# Patient Record
Sex: MALE | Race: WHITE
[De-identification: names, ages, dates, MRNs, and addresses within clinical notes are randomized per-mention and may not be internally consistent; named-entity substitution may affect disease eponyms.]

---

## 2019-06-20 ENCOUNTER — HOSPITAL ENCOUNTER (EMERGENCY)
Dept: HOSPITAL 56 - MW.ED | Age: 74
Discharge: HOME | End: 2019-06-20
Payer: MEDICARE

## 2019-06-20 DIAGNOSIS — Z79.84: ICD-10-CM

## 2019-06-20 DIAGNOSIS — R10.9: Primary | ICD-10-CM

## 2019-06-20 DIAGNOSIS — E11.9: ICD-10-CM

## 2019-06-20 DIAGNOSIS — Z86.73: ICD-10-CM

## 2019-06-20 LAB
CHLORIDE SERPL-SCNC: 107 MMOL/L (ref 98–107)
SODIUM SERPL-SCNC: 140 MMOL/L (ref 136–148)

## 2019-06-20 PROCEDURE — 87040 BLOOD CULTURE FOR BACTERIA: CPT

## 2019-06-20 PROCEDURE — 74177 CT ABD & PELVIS W/CONTRAST: CPT

## 2019-06-20 PROCEDURE — 81003 URINALYSIS AUTO W/O SCOPE: CPT

## 2019-06-20 PROCEDURE — 85610 PROTHROMBIN TIME: CPT

## 2019-06-20 PROCEDURE — 82150 ASSAY OF AMYLASE: CPT

## 2019-06-20 PROCEDURE — 84484 ASSAY OF TROPONIN QUANT: CPT

## 2019-06-20 PROCEDURE — 85025 COMPLETE CBC W/AUTO DIFF WBC: CPT

## 2019-06-20 PROCEDURE — 80053 COMPREHEN METABOLIC PANEL: CPT

## 2019-06-20 PROCEDURE — 83605 ASSAY OF LACTIC ACID: CPT

## 2019-06-20 PROCEDURE — 36415 COLL VENOUS BLD VENIPUNCTURE: CPT

## 2019-06-20 PROCEDURE — 93005 ELECTROCARDIOGRAM TRACING: CPT

## 2019-06-20 PROCEDURE — 83690 ASSAY OF LIPASE: CPT

## 2019-06-20 PROCEDURE — 96360 HYDRATION IV INFUSION INIT: CPT

## 2019-06-20 PROCEDURE — 71045 X-RAY EXAM CHEST 1 VIEW: CPT

## 2019-06-20 PROCEDURE — 99284 EMERGENCY DEPT VISIT MOD MDM: CPT

## 2019-06-20 NOTE — CT
INDICATION:



Right-sided abdominal pain 



TECHNIQUE:



CT abdomen and pelvis acquired with 80 cc Isovue 370 IV contrast.



COMPARISON:



None 



FINDINGS:



Lower chest: Coronary artery calcifications. Small hiatal hernia. 



Liver: Unremarkable.  



Spleen: Unremarkable.  



Pancreas: Unremarkable.  



Gallbladder and bile ducts: Cholelithiasis. 



Adrenal glands: Unremarkable.  



Kidneys: Left renal atrophy. Nonobstructive left nephrolithiasis. Hypodense 

1.1 cm lesion on the right kidney measuring 30 Hounsfield units in density. 

GI tract: Unremarkable.  Appendix is normal.  



Vascular structures: Moderate atherosclerotic disease. 



Lymph nodes: Unremarkable.  



Miscellaneous: Small fat containing umbilical hernia. No free air or 

significant free fluid.  



Pelvic Organs: Unremarkable.  



Bones: Unremarkable for age.  



IMPRESSION:



No etiology seen to explain right-sided abdominal pain. Normal appendix. 



Coronary artery disease.



Small hiatal hernia.



Cholelithiasis.



Left renal atrophy with nonobstructive left nephrolithiasis.



Indeterminate hypodense lesion on the right kidney. Recommend nonemergent 

renal ultrasound for further characterization.



Small fat containing umbilical hernia.



Please note that all CT scans at this facility use dose modulation, 

iterative reconstruction, and/or weight-based dosing when appropriate to 

reduce radiation dose to as low as reasonably achievable.



Dictated by Nancy Briones MD @ Jun 20 2019 11:08PM



Signed by Dr. Nancy Briones @ Jun 20 2019 11:22PM

## 2019-06-20 NOTE — CR
Indication:



Abdominal pain



Technique:



Chest 1 view



Comparison:



None



Findings/Impression:



Cardiovascular and mediastinum: Heart size and vasculature are normal in 

caliber and appearance.  Mediastinum is within normal limits.  



Lungs and pleural space: Lungs are clear.  No sign of infiltrate or mass.  

No sign of pleural effusion.  No pneumothorax.  



Bones and soft tissues: No significant findings.



Dictated by Nancy Briones MD @ Jun 20 2019 10:56PM



Signed by Dr. Nancy Briones @ Jun 20 2019 10:58PM

## 2019-06-20 NOTE — EDM.PDOC
ED HPI GENERAL MEDICAL PROBLEM





- General


Chief Complaint: Abdominal Pain


Stated Complaint: ABM


Time Seen by Provider: 06/20/19 21:39





- History of Present Illness


INITIAL COMMENTS - FREE TEXT/NARRATIVE: 


HISTORY AND PHYSICAL:





History of present illness:


Patient is a 73-year-old white male who presents with concern of abdominal pain 

this is not localized it's intermittent and on arrival here has resolved he 

denies fever chills urinary symptoms nausea vomiting diarrhea or other 

complaints





Review of systems: 


As per history of present illness and below otherwise all systems reviewed and 

negative.





Past medical history: 


As per history of present illness and as reviewed below otherwise 

noncontributory.





Surgical history: 


As per history of present illness and as reviewed below otherwise 

noncontributory.





Social history: 


No reported history of drug or alcohol abuse.





Family history: 


As per history of present illness and as reviewed below otherwise 

noncontributory.





Physical exam:


HEENT: Atraumatic, normocephalic, pupils reactive, negative for conjunctival 

pallor or scleral icterus, mucous membranes moist, throat clear, neck supple, 

nontender, trachea midline.


Lungs: Clear to auscultation, breath sounds equal bilaterally, chest nontender.


Heart: S1S2, regular, negative for clicks, rubs, or JVD.


Abdomen: Soft, nondistended, nontender. Negative for masses or 

hepatosplenomegaly. Negative for costovertebral tenderness.


Pelvis: Stable nontender.


Genitourinary: Deferred.


Rectal: Deferred.


Extremities: Atraumatic, negative for cords or calf pain. Neurovascular 

unremarkable.


Neuro: Awake, alert, oriented. Cranial nerves II through XII unremarkable. 

Cerebellum unremarkable. Motor and sensory unremarkable throughout. Exam 

nonfocal.





Diagnostics:


CBC CMP amylase lipase UA CT abdomen and pelvis with IV contrast





Therapeutics:


 saline 1 L bolus





Impression: 


#1 intermittent undifferentiated abdominal pain resolved





Definitive disposition and diagnosis as appropriate pending reevaluation and 

review of above.





  ** lower abdomen


Pain Score (Numeric/FACES): 6





- Related Data


 Allergies











Allergy/AdvReac Type Severity Reaction Status Date / Time


 


No Known Allergies Allergy   Verified 06/20/19 21:39











Home Meds: 


 Home Meds





metFORMIN [Glucophage XR] 5 tab PO BEDTIME 06/20/19 [History]











Past Medical History


HEENT History: Reports: Cataract


Cardiovascular History: Reports: None


Respiratory History: Reports: None


Gastrointestinal History: Reports: Other (See Below)


Other Gastrointestinal History: abdominal herina


Genitourinary History: Reports: Renal Calculus


Musculoskeletal History: Reports: None


Neurological History: Reports: CVA


Psychiatric History: Reports: None


Endocrine/Metabolic History: Reports: Diabetes, Type II


Hematologic History: Reports: None


Immunologic History: Reports: None


Oncologic (Cancer) History: Reports: None


Dermatologic History: Reports: None





- Past Surgical History


Head Surgeries/Procedures: Reports: None


HEENT Surgical History: Reports: Cataract Surgery


Cardiovascular Surgical History: Reports: None


Respiratory Surgical History: Reports: None


GI Surgical History: Reports: None


Male  Surgical History: Reports: None


Endocrine Surgical History: Reports: None


Neurological Surgical History: Reports: None


Musculoskeletal Surgical History: Reports: None


Oncologic Surgical History: Reports: None


Dermatological Surgical History: Reports: None





Social & Family History





- Family History


Family Medical History: Noncontributory





- Tobacco Use


Smoking Status *Q: Never Smoker


Second Hand Smoke Exposure: No





- Caffeine Use


Caffeine Use: Reports: Coffee





- Recreational Drug Use


Recreational Drug Use: No





ED ROS GENERAL





- Review of Systems


Review Of Systems: ROS reveals no pertinent complaints other than HPI.





ED EXAM, GENERAL





- Physical Exam


Exam: See Below (See dictation)





Course





- Vital Signs


Text/Narrative:: 


I discussed with patient admission for observation in light of his diagnostics 

be in nonspecific at this point and resolution he prefers outpatient follow-up





Last Recorded V/S: 





 Last Vital Signs











Temp  36.3 C   06/20/19 21:23


 


Pulse  54 L  06/20/19 21:23


 


Resp  18   06/20/19 21:23


 


BP  142/81 H  06/20/19 21:23


 


Pulse Ox  95   06/20/19 21:23














- Orders/Labs/Meds


Orders: 





 Active Orders 24 hr











 Category Date Time Status


 


 EKG Documentation Completion [RC] STAT Care  06/20/19 21:37 Active


 


 CULTURE BLOOD [BC] Stat Lab  06/20/19 21:46 Received


 


 CULTURE BLOOD [BC] Stat Lab  06/20/19 21:57 Received


 


 Sodium Chloride 0.9% [Saline Flush] Med  06/20/19 21:38 Active





 10 ml FLUSH ASDIRECTED PRN   


 


 Sodium Chloride 0.9% [Saline Flush] Med  06/20/19 21:38 Active





 2.5 ml FLUSH ASDIRECTED PRN   


 


 Blood Culture x2 Reflex Set [OM.PC] Stat Oth  06/20/19 21:38 Ordered


 


 Saline Lock Insert [OM.PC] Stat Ot  06/20/19 21:37 Ordered








 Medication Orders





Sodium Chloride (Saline Flush)  10 ml FLUSH ASDIRECTED PRN


   PRN Reason: Keep Vein Open


Sodium Chloride (Saline Flush)  2.5 ml FLUSH ASDIRECTED PRN


   PRN Reason: Keep Vein Open








Labs: 





 Laboratory Tests











  06/20/19 06/20/19 06/20/19 Range/Units





  21:31 21:31 21:31 


 


WBC  10.90    (4.0-11.0)  K/uL


 


RBC  4.60    (4.50-5.90)  M/uL


 


Hgb  14.9    (13.0-17.0)  g/dL


 


Hct  44.1    (38.0-50.0)  %


 


MCV  95.9    (80.0-98.0)  fL


 


MCH  32.4 H    (27.0-32.0)  pg


 


MCHC  33.8    (31.0-37.0)  g/dL


 


RDW Std Deviation  49.4    (28.0-62.0)  fl


 


RDW Coeff of Renae  14    (11.0-15.0)  %


 


Plt Count  221    (150-400)  K/uL


 


MPV  10.80    (7.40-12.00)  fL


 


Neut % (Auto)  58.3    (48.0-80.0)  %


 


Lymph % (Auto)  30.5    (16.0-40.0)  %


 


Mono % (Auto)  8.3    (0.0-15.0)  %


 


Eos % (Auto)  2.4    (0.0-7.0)  %


 


Baso % (Auto)  0.5    (0.0-1.5)  %


 


Neut # (Auto)  6.4 H    (1.4-5.7)  K/uL


 


Lymph # (Auto)  3.3 H    (0.6-2.4)  K/uL


 


Mono # (Auto)  0.9 H    (0.0-0.8)  K/uL


 


Eos # (Auto)  0.3    (0.0-0.7)  K/uL


 


Baso # (Auto)  0.1    (0.0-0.1)  K/uL


 


Nucleated RBC %  0.0    /100WBC


 


Nucleated RBCs #  0    K/uL


 


INR   0.96   


 


Lactate     (0.20-2.00)  mmol/L


 


Sodium    140  (136-148)  mmol/L


 


Potassium    4.1  (3.5-5.1)  mmol/L


 


Chloride    107  ()  mmol/L


 


Carbon Dioxide    27.4  (21.0-32.0)  mmol/L


 


BUN    16  (7.0-18.0)  mg/dL


 


Creatinine    1.3  (0.8-1.3)  mg/dL


 


Est Cr Clr Drug Dosing    53.06  mL/min


 


Estimated GFR (MDRD)    53.7  ml/min


 


Glucose    114 H  ()  mg/dL


 


Calcium    8.4 L  (8.5-10.1)  mg/dL


 


Total Bilirubin    0.6  (0.2-1.0)  mg/dL


 


AST    20  (15-37)  IU/L


 


ALT    20  (14-63)  IU/L


 


Alkaline Phosphatase    88  ()  U/L


 


Troponin I    < 0.050  (0.000-0.056)  ng/mL


 


Total Protein    6.7  (6.4-8.2)  g/dL


 


Albumin    3.6  (3.4-5.0)  g/dL


 


Globulin    3.1  (2.6-4.0)  g/dL


 


Albumin/Globulin Ratio    1.2  (0.9-1.6)  


 


Amylase    26  ()  U/L


 


Lipase    120  ()  U/L


 


Urine Color     


 


Urine Appearance     


 


Urine pH     (5.0-8.0)  


 


Ur Specific Gravity     (1.001-1.035)  


 


Urine Protein     (NEGATIVE)  mg/dL


 


Urine Glucose (UA)     (NEGATIVE)  mg/dL


 


Urine Ketones     (NEGATIVE)  mg/dL


 


Urine Occult Blood     (NEGATIVE)  


 


Urine Nitrite     (NEGATIVE)  


 


Urine Bilirubin     (NEGATIVE)  


 


Urine Urobilinogen     (<2.0)  EU/dL


 


Ur Leukocyte Esterase     (NEGATIVE)  














  06/20/19 06/20/19 Range/Units





  21:46 22:15 


 


WBC    (4.0-11.0)  K/uL


 


RBC    (4.50-5.90)  M/uL


 


Hgb    (13.0-17.0)  g/dL


 


Hct    (38.0-50.0)  %


 


MCV    (80.0-98.0)  fL


 


MCH    (27.0-32.0)  pg


 


MCHC    (31.0-37.0)  g/dL


 


RDW Std Deviation    (28.0-62.0)  fl


 


RDW Coeff of Renae    (11.0-15.0)  %


 


Plt Count    (150-400)  K/uL


 


MPV    (7.40-12.00)  fL


 


Neut % (Auto)    (48.0-80.0)  %


 


Lymph % (Auto)    (16.0-40.0)  %


 


Mono % (Auto)    (0.0-15.0)  %


 


Eos % (Auto)    (0.0-7.0)  %


 


Baso % (Auto)    (0.0-1.5)  %


 


Neut # (Auto)    (1.4-5.7)  K/uL


 


Lymph # (Auto)    (0.6-2.4)  K/uL


 


Mono # (Auto)    (0.0-0.8)  K/uL


 


Eos # (Auto)    (0.0-0.7)  K/uL


 


Baso # (Auto)    (0.0-0.1)  K/uL


 


Nucleated RBC %    /100WBC


 


Nucleated RBCs #    K/uL


 


INR    


 


Lactate  2.0   (0.20-2.00)  mmol/L


 


Sodium    (136-148)  mmol/L


 


Potassium    (3.5-5.1)  mmol/L


 


Chloride    ()  mmol/L


 


Carbon Dioxide    (21.0-32.0)  mmol/L


 


BUN    (7.0-18.0)  mg/dL


 


Creatinine    (0.8-1.3)  mg/dL


 


Est Cr Clr Drug Dosing    mL/min


 


Estimated GFR (MDRD)    ml/min


 


Glucose    ()  mg/dL


 


Calcium    (8.5-10.1)  mg/dL


 


Total Bilirubin    (0.2-1.0)  mg/dL


 


AST    (15-37)  IU/L


 


ALT    (14-63)  IU/L


 


Alkaline Phosphatase    ()  U/L


 


Troponin I    (0.000-0.056)  ng/mL


 


Total Protein    (6.4-8.2)  g/dL


 


Albumin    (3.4-5.0)  g/dL


 


Globulin    (2.6-4.0)  g/dL


 


Albumin/Globulin Ratio    (0.9-1.6)  


 


Amylase    ()  U/L


 


Lipase    ()  U/L


 


Urine Color   YELLOW  


 


Urine Appearance   CLEAR  


 


Urine pH   7.0  (5.0-8.0)  


 


Ur Specific Gravity   1.015  (1.001-1.035)  


 


Urine Protein   NEGATIVE  (NEGATIVE)  mg/dL


 


Urine Glucose (UA)   NEGATIVE  (NEGATIVE)  mg/dL


 


Urine Ketones   NEGATIVE  (NEGATIVE)  mg/dL


 


Urine Occult Blood   NEGATIVE  (NEGATIVE)  


 


Urine Nitrite   NEGATIVE  (NEGATIVE)  


 


Urine Bilirubin   NEGATIVE  (NEGATIVE)  


 


Urine Urobilinogen   1.0  (<2.0)  EU/dL


 


Ur Leukocyte Esterase   NEGATIVE  (NEGATIVE)  











Meds: 





Medications











Generic Name Dose Route Start Last Admin





  Trade Name Freq  PRN Reason Stop Dose Admin


 


Sodium Chloride  10 ml  06/20/19 21:38  





  Saline Flush  FLUSH   





  ASDIRECTED PRN   





  Keep Vein Open   





     





     





     


 


Sodium Chloride  2.5 ml  06/20/19 21:38  





  Saline Flush  FLUSH   





  ASDIRECTED PRN   





  Keep Vein Open   





     





     





     














Discontinued Medications














Generic Name Dose Route Start Last Admin





  Trade Name Freq  PRN Reason Stop Dose Admin


 


Sodium Chloride  1,000 mls @ 999 mls/hr  06/20/19 21:38  06/20/19 22:06





  Normal Saline  IV  06/20/19 22:38  999 mls/hr





  STAT ONE   Administration





     





     





     





     


 


Iopamidol  100 ml  06/20/19 22:51  06/20/19 22:52





  Isovue Multipack-370 (76%)  IVPUSH  06/20/19 22:52  100 ml





  ONETIME STA   Administration





     





     





     





     














Departure





- Departure


Time of Disposition: 23:30


Disposition: Home, Self-Care 01


Clinical Impression: 


 Abdominal pain








- Discharge Information


Additional Instructions: 














The following information is given to patients seen in the emergency department 

who are being discharged to home. This information is to outline your options 

for follow-up care. We provide all patients seen in our emergency department 

with a follow-up referral.





The need for follow-up, as well as the timing and circumstances, are variable 

depending upon the specifics of your emergency department visit.





If you don't have a primary care physician on staff, we will provide you with a 

referral. We always advise you to contact your personal physician following an 

emergency department visit to inform them of the circumstance of the visit and 

for follow-up with them and/or the need for any referrals to a consulting 

specialist.





The emergency department will also refer you to a specialist when appropriate. 

This referral assures that you have the opportunity for followup care with a 

specialist. All of these measure are taken in an effort to provide you with 

optimal care, which includes your followup.





Under all circumstances we always encourage you to contact your private 

physician who remains a resource for coordinating  your care. When calling for 

followup care, please make the office aware that this follow-up is from your 

recent emergency room visit. If for any reason you are refused follow-up, 

please contact the [facility] emergency department at [phone] 

and asked to speak to the emergency department charge nurse











[facility]


Specialty Care - General Surgery


[address]


[address] [place] [address], [address]


[place] [address]


Phone: [phone]


Fax: [phone]





Follow-up primary medical doctor as needed as discussed follow-up Gen. surgery 

above as discussed return as needed as discussed











.


[]








- My Orders


Last 24 Hours: 





My Active Orders





06/20/19 21:37


EKG Documentation Completion [RC] STAT 


Saline Lock Insert [OM.PC] Stat 





06/20/19 21:38


Sodium Chloride 0.9% [Saline Flush]   10 ml FLUSH ASDIRECTED PRN 


Sodium Chloride 0.9% [Saline Flush]   2.5 ml FLUSH ASDIRECTED PRN 


Blood Culture x2 Reflex Set [OM.PC] Stat 





06/20/19 21:46


CULTURE BLOOD [BC] Stat 





06/20/19 21:57


CULTURE BLOOD [BC] Stat 














- Assessment/Plan


Last 24 Hours: 





My Active Orders





06/20/19 21:37


EKG Documentation Completion [RC] STAT 


Saline Lock Insert [OM.PC] Stat 





06/20/19 21:38


Sodium Chloride 0.9% [Saline Flush]   10 ml FLUSH ASDIRECTED PRN 


Sodium Chloride 0.9% [Saline Flush]   2.5 ml FLUSH ASDIRECTED PRN 


Blood Culture x2 Reflex Set [OM.PC] Stat 





06/20/19 21:46


CULTURE BLOOD [BC] Stat 





06/20/19 21:57


CULTURE BLOOD [BC] Stat